# Patient Record
Sex: FEMALE | Race: WHITE
[De-identification: names, ages, dates, MRNs, and addresses within clinical notes are randomized per-mention and may not be internally consistent; named-entity substitution may affect disease eponyms.]

---

## 2022-01-24 ENCOUNTER — HOSPITAL ENCOUNTER (OUTPATIENT)
Dept: HOSPITAL 76 - DI | Age: 37
Discharge: HOME | End: 2022-01-24
Attending: STUDENT IN AN ORGANIZED HEALTH CARE EDUCATION/TRAINING PROGRAM
Payer: COMMERCIAL

## 2022-01-24 DIAGNOSIS — M51.16: Primary | ICD-10-CM

## 2022-01-24 DIAGNOSIS — M51.17: ICD-10-CM

## 2022-01-24 NOTE — MRI REPORT
PROCEDURE:  Lumbar Spine W/O

 

INDICATIONS:  LUMBAR RADICULOPATHY

 

TECHNIQUE:  

Noncontrast sagittal T1 spin echo and T2 fast echo, sagittal STIR, axial T1 and T2 fast spin echo thr
ough the lumbar spine.  In cases with scoliosis, additional coronal T2 fast spin echo may be performe
d.  

 

COMPARISON:  None.

 

FINDINGS:  

Image quality: Diagnostic

 

Alignment and Curvature:  Mild levoconvex scoliotic curvature is seen.    No significant AP alignment
 abnormality can be seen.   

 

Bone Marrow:  Marrow is of normal overall signal.  No acute vertebral body compression fractures.  

 

Spinal Cord:  Conus medullaris terminates at the L1 level.  Visualized cord demonstrates normal signa
l and size.  

 

Paraspinous Soft Tissues:  No paravertebral masses.  There is a simple appearing 1.7 cm cyst along th
e medial aspect of the right kidney.

 

T12-L1:  Normal in appearance.  

 

L1-L2:    Normal in appearance. 

 

L2-L3:    Normal in appearance. 

 

L3-L4:   Normal in appearance. 

 

L4-L5:    The disc height is well-preserved. There is loss of disc signal seen.  Mild  disc bulge is 
seen, which is slightly eccentric to the right. There is a right posterior annular fissure seen, as o
n series 4 and 1 image 15 and on series 6 and 1 image 12. No significant neural foraminal or central 
canal narrowing can be seen.    

 

L5-S1:   Note is made of an annular fissure posteriorly.   No significant neural foraminal or central
 canal narrowing can be seen.     

 

 

 

IMPRESSION:  Mild lower lumbar spine degenerative changes are seen.

 

No significant neural foraminal or central canal narrowing can be seen.   

 

Mild levoconvex scoliotic curvature is seen.  

 

Reviewed by: Ruddy Grissom MD on 1/24/2022 12:16 PM [place]

Approved by: Ruddy Grissom MD on 1/24/2022 12:16 PM [facility]

 

 

Station ID:  [id number]